# Patient Record
Sex: FEMALE | Race: WHITE
[De-identification: names, ages, dates, MRNs, and addresses within clinical notes are randomized per-mention and may not be internally consistent; named-entity substitution may affect disease eponyms.]

---

## 2018-10-25 ENCOUNTER — HOSPITAL ENCOUNTER (OUTPATIENT)
Dept: HOSPITAL 92 - SCSULT | Age: 48
Discharge: HOME | End: 2018-10-25
Attending: OTOLARYNGOLOGY
Payer: COMMERCIAL

## 2018-10-25 DIAGNOSIS — E04.1: Primary | ICD-10-CM

## 2018-10-25 PROCEDURE — 76536 US EXAM OF HEAD AND NECK: CPT

## 2018-10-25 NOTE — ULT
THYROID ULTRASOUND

10/25/18

 

COMPARISON:  

None.

 

HISTORY: 

48-year-old female with a history of thyroid nodule. 

 

TECHNIQUE:  

Multiplanar gray scale sonographic imaging of the thyroid gland obtained. 

 

FINDINGS:  

The thyroid isthmus measures approximately 3 mm in AP dimension. 

 

The right lobe measures 2.7 x 4.9 x 2.5 cm. There is a large complex primarily hypoechoic solid nodul
e within the right lobe of the thyroid gland measuring 3.7 x 2.4 x 2.2 cm. Left lobe measures 1.1 x 3
.9 x 0.8 cm. No left thyroid nodule identified. 

 

IMPRESSION:  

Large solid nodule within the right lobe of the thyroid gland measuring up to 3.7 cm. This is a TIRAD
S category 4 lesion-moderately suspicious. Given its size of greater than 1.5 cm, fine needle aspirat
ion advised. 

 

Code T 

 

POS: NATE

## 2019-08-14 ENCOUNTER — HOSPITAL ENCOUNTER (OUTPATIENT)
Dept: HOSPITAL 92 - BICULT | Age: 49
Discharge: HOME | End: 2019-08-14
Attending: OTOLARYNGOLOGY
Payer: COMMERCIAL

## 2019-08-14 DIAGNOSIS — E04.1: Primary | ICD-10-CM

## 2019-08-14 PROCEDURE — 76536 US EXAM OF HEAD AND NECK: CPT

## 2019-08-14 NOTE — ULT
THYROID ULTRASOUND:

8/14/19

 

HISTORY: 

Complex right thyroid nodule.

 

COMPARISON: 

10/25/18.

 

FINDINGS: 

There is a complex solid nodule occupying the majority of the right thyroid lobe measuring 3.9 x 2.3 
x 3.0 cm. Small components of cystic degeneration are noted.  No additional nodules throughout the th
yroid gland. 

 

Right thyroid lobe measures 5.6 x 2.4 x 3.2 cm.  Left thyroid lobe measures 3.9 x 0.7 x 1.3 cm. Thyro
id isthmus measures 0.2 cm. 

 

IMPRESSION: 

Slightly complex, predominantly solid nodule in the right thyroid lobe. No appreciable change with re
gards to size. This nodule has a TI-RADS calculator score of TR4. Fine needle aspiration is advised. 


 

Code T 

 

POS: OFF

## 2020-07-20 ENCOUNTER — HOSPITAL ENCOUNTER (EMERGENCY)
Dept: HOSPITAL 92 - ERS | Age: 50
Discharge: HOME | End: 2020-07-20
Payer: COMMERCIAL

## 2020-07-20 DIAGNOSIS — R07.89: Primary | ICD-10-CM

## 2020-07-20 LAB
ALBUMIN SERPL BCG-MCNC: 4.4 G/DL (ref 3.5–5)
ALP SERPL-CCNC: 114 U/L (ref 40–110)
ALT SERPL W P-5'-P-CCNC: 17 U/L (ref 8–55)
ANION GAP SERPL CALC-SCNC: 11 MMOL/L (ref 10–20)
AST SERPL-CCNC: 16 U/L (ref 5–34)
BASOPHILS # BLD AUTO: 0 THOU/UL (ref 0–0.2)
BASOPHILS NFR BLD AUTO: 0.4 % (ref 0–1)
BILIRUB SERPL-MCNC: 0.2 MG/DL (ref 0.2–1.2)
BUN SERPL-MCNC: 8 MG/DL (ref 7–18.7)
CALCIUM SERPL-MCNC: 10.6 MG/DL (ref 7.8–10.44)
CHLORIDE SERPL-SCNC: 105 MMOL/L (ref 98–107)
CO2 SERPL-SCNC: 26 MMOL/L (ref 22–29)
CREAT CL PREDICTED SERPL C-G-VRATE: 0 ML/MIN (ref 70–130)
EOSINOPHIL # BLD AUTO: 0.1 THOU/UL (ref 0–0.7)
EOSINOPHIL NFR BLD AUTO: 1.1 % (ref 0–10)
GLOBULIN SER CALC-MCNC: 4 G/DL (ref 2.4–3.5)
GLUCOSE SERPL-MCNC: 159 MG/DL (ref 70–105)
HGB BLD-MCNC: 13.6 G/DL (ref 12–16)
LIPASE SERPL-CCNC: 55 U/L (ref 8–78)
LYMPHOCYTES # BLD: 2 THOU/UL (ref 1.2–3.4)
LYMPHOCYTES NFR BLD AUTO: 21.9 % (ref 21–51)
MCH RBC QN AUTO: 26.2 PG (ref 27–31)
MCV RBC AUTO: 83.9 FL (ref 78–98)
MONOCYTES # BLD AUTO: 0.2 THOU/UL (ref 0.11–0.59)
MONOCYTES NFR BLD AUTO: 2.4 % (ref 0–10)
NEUTROPHILS # BLD AUTO: 6.9 THOU/UL (ref 1.4–6.5)
NEUTROPHILS NFR BLD AUTO: 74.3 % (ref 42–75)
PLATELET # BLD AUTO: 278 THOU/UL (ref 130–400)
POTASSIUM SERPL-SCNC: 4.2 MMOL/L (ref 3.5–5.1)
RBC # BLD AUTO: 5.2 MILL/UL (ref 4.2–5.4)
SODIUM SERPL-SCNC: 138 MMOL/L (ref 136–145)
TROPONIN I SERPL DL<=0.01 NG/ML-MCNC: (no result) NG/ML (ref ?–0.03)
WBC # BLD AUTO: 9.3 THOU/UL (ref 4.8–10.8)

## 2020-07-20 PROCEDURE — 80053 COMPREHEN METABOLIC PANEL: CPT

## 2020-07-20 PROCEDURE — 36415 COLL VENOUS BLD VENIPUNCTURE: CPT

## 2020-07-20 PROCEDURE — 71045 X-RAY EXAM CHEST 1 VIEW: CPT

## 2020-07-20 PROCEDURE — 83690 ASSAY OF LIPASE: CPT

## 2020-07-20 PROCEDURE — 93005 ELECTROCARDIOGRAM TRACING: CPT

## 2020-07-20 PROCEDURE — 84484 ASSAY OF TROPONIN QUANT: CPT

## 2020-07-20 PROCEDURE — 85025 COMPLETE CBC W/AUTO DIFF WBC: CPT

## 2020-07-20 NOTE — RAD
EXAM: Single view of the chest



HISTORY:   Chest pain



COMPARISON: None



FINDINGS: Single view of the chest shows a normal sized cardiomediastinal silhouette.  Atheroscleroti
c calcifications are seen in the aorta.  There is no evidence of consolidation, mass, or pleural

effusion. The bones are unremarkable



IMPRESSION: No evidence of acute cardiopulmonary disease



Reported By: Donald Coates 

Electronically Signed:  7/20/2020 7:44 AM